# Patient Record
Sex: FEMALE | Race: BLACK OR AFRICAN AMERICAN | NOT HISPANIC OR LATINO | URBAN - METROPOLITAN AREA
[De-identification: names, ages, dates, MRNs, and addresses within clinical notes are randomized per-mention and may not be internally consistent; named-entity substitution may affect disease eponyms.]

---

## 2018-01-24 ENCOUNTER — EMERGENCY (EMERGENCY)
Facility: HOSPITAL | Age: 33
LOS: 0 days | Discharge: HOME | End: 2018-01-24
Attending: EMERGENCY MEDICINE
Payer: MEDICAID

## 2018-01-24 VITALS
SYSTOLIC BLOOD PRESSURE: 122 MMHG | TEMPERATURE: 98 F | OXYGEN SATURATION: 100 % | RESPIRATION RATE: 16 BRPM | WEIGHT: 119.93 LBS | HEART RATE: 86 BPM | DIASTOLIC BLOOD PRESSURE: 75 MMHG | HEIGHT: 61 IN

## 2018-01-24 PROCEDURE — 10060 I&D ABSCESS SIMPLE/SINGLE: CPT

## 2018-01-24 PROCEDURE — 99284 EMERGENCY DEPT VISIT MOD MDM: CPT | Mod: 25

## 2018-01-24 PROCEDURE — 25600 CLTX DST RDL FX/EPHYS SEP WO: CPT | Mod: 54

## 2018-01-24 RX ORDER — IBUPROFEN 200 MG
600 TABLET ORAL ONCE
Qty: 0 | Refills: 0 | Status: COMPLETED | OUTPATIENT
Start: 2018-01-24 | End: 2018-01-24

## 2018-01-24 RX ORDER — IBUPROFEN 200 MG
1 TABLET ORAL
Qty: 20 | Refills: 0 | OUTPATIENT
Start: 2018-01-24

## 2018-01-24 RX ADMIN — Medication 600 MILLIGRAM(S): at 19:03

## 2018-01-24 RX ADMIN — Medication 1 TABLET(S): at 19:03

## 2018-01-24 RX ADMIN — Medication 600 MILLIGRAM(S): at 19:23

## 2018-01-24 NOTE — ED PROVIDER NOTE - OBJECTIVE STATEMENT
33 y/o female with no PMH here c/o lump to chest x 1 year. pt states the past 3 days it has been more painful and irritated. no fevers. pt is currently menstruating. denies cp, sob, HA, n/v    ROS: No fever/chills. No eye pain/changes in vision, No ear pain/sore throat/dysphagia, No chest pain/palpitations. No SOB/cough/. No abdominal pain, N/V/D, no black/bloody bm. No dysuria/frequency/discharge, No headache. No Dizziness.    +lump mid chest. No numbness/tingling/weakness.

## 2018-01-24 NOTE — ED PROVIDER NOTE - ATTENDING CONTRIBUTION TO CARE
33 yo female with no pertinent pmh presents with lump to mid chest. Pt reports noted bump x 1 year, but never painful, in past 2-3 days started getting bigger, redder and more painful. on exam: mildly fluctuance, erythema, no induration. on sono: cyst like structure with internal shadows ? abscess vs sebaceous

## 2018-01-24 NOTE — ED ADULT NURSE NOTE - CAS EDN DISCHARGE ASSESSMENT
Alert and oriented to person, place and time/Awake/No adverse reaction to first time med in ED/Dressing clean and dry/Symptoms improved

## 2018-01-24 NOTE — ED ADULT NURSE NOTE - OBJECTIVE STATEMENT
pt alert and oriented x4, pt c/o pain to chest from a bump, pt with cyst that is red, elevated and painful to touch

## 2018-01-24 NOTE — ED PROVIDER NOTE - PHYSICAL EXAMINATION
Gen: Alert, NAD  Head: NC, AT, PERRL, EOMI, normal lids/conjunctiva  ENT: B TM WNL, normal hearing, patent oropharynx without erythema/exudate, uvula midline  Neck: +supple, no tenderness/meningismus/JVD, +Trachea midline  Pulm: Bilateral BS, normal resp effort, no wheeze/stridor/retractions  CV: RRR, no M/R/G, +dist pulses  Abd: soft, NT/ND, +BS, no hepatosplenomegaly  Mskel: no edema/erythema/cyanosis  Skin: [id number] circular lump mid chest, mild fluctuance, no warmth or erythema  Neuro: AAOx3, no sensory/motor deficits, CN 2-12 intact

## 2018-01-24 NOTE — ED PROVIDER NOTE - MEDICAL DECISION MAKING DETAILS
Pt well appearing, ortho reduced wrist, fu with ortho. Discussed results and outcome of testing with the patient.  Patient given copy of available results. Patient advised to please follow up with their PMD within the next 24 hours and return to the Emergency Department for worsening symptoms or any other concerns. Unclear complex cyst vs sebaceous cyst. fu with plastics. augmentin rx.

## 2018-01-25 DIAGNOSIS — L72.9 FOLLICULAR CYST OF THE SKIN AND SUBCUTANEOUS TISSUE, UNSPECIFIED: ICD-10-CM
